# Patient Record
(demographics unavailable — no encounter records)

---

## 2025-04-02 NOTE — HISTORY OF PRESENT ILLNESS
[FreeTextEntry1] : Received live donor transplant from son on 11/20/17. He was hospitalized in June 2024 at [facility] for UTI received IV antibiotics. Was told to have aortic stenosis, no symptoms. Has seen cardiologist at [facility], now has been seeing Ct surgeon at [facility], planned for AVR in future. No symptoms yet. No chest pain/SOB/Dizziness or syncope. He had radiation in May 2024 for a lesion in the prostate (X 28 days). He is followed by urologist- Seth . He is also followed  at [place] [facility].  He had TAVR on Sept 11, 2024.  He had  first grandchild from daughter, now 2 months old. He has no acute symptoms today.   No activity restrictions. Son, donor is doing well. He is now retired, now works as  security at [employer].   Had eye surgery to fix strabismus on right eye in September 2023.  Had recent UTI treated.  Functional status 100%   He is taking Tacro 1.5 AM and 2 PM MMF 1000 BID Pred 5/d He is on Flomax 0.4/d ASA 81/d Atorvastatin 20/d Mounjaro 12.5 mg weekly Pantoprazole Repaglinide Metoprolol succ 50/d  Has had eye and Derm check  Has been going fishing

## 2025-04-02 NOTE — ASSESSMENT
[FreeTextEntry1] : Renal Transplant recipient: Noted allograft function, creatinine is stable/improved. No proteinuria. Tolerating medications. Lab data from last visit reviewed including allograft function, urinalysis, any viremia and trough level of medication. Immunosuppression: reviewed; Noted current regimen, maintenance regimen and reviewed target trough level. Hypertension: Home readings are controlled 130-135/70 ; Reviewed medications.  Reviewed target for blood pressure control. Recent UTI: Treated, resolved. Prostate Ca: Had radiation Hyperlipidemia: Reviewed medication regimen/lifestyle modification for maintaining target lipid levels. Cardiovascular risk reduction, primary/secondary prevention measures were discussed as appropriate.   Prophylaxis: Reviewed precautions to prevent infections. Aortic valve disease: Planned for procedure. He had second opinion. Discussed Renal Preservation strategies including achieving optimal weight through calory restriction and regular exercise, daily exercise, sleep hygiene, optimized control of glucose, blood pressure, lipids, avoidance of NSAIDs, Low Na and Low animal protein diet and medication adherence. Discussed ophthalmology and dermatology checks at least yearly and vaccinations. Flu vaccine yearly and Pneumonia vaccine every 5 years. Copy of office visit and lab reports are being made available to primary physician and referring nephrologist.

## 2025-04-02 NOTE — END OF VISIT
[>50% of Time Spent on Counseling for ____] : Greater than 50% of the encounter time was spent on counseling for [unfilled] [FreeTextEntry1] : JACKIE Ac [Time Spent: ___ minutes] : I have spent [unfilled] minutes of time on the encounter which excludes teaching and separately reported services.

## 2025-04-02 NOTE — PHYSICAL EXAM
[General Appearance - Alert] : alert [General Appearance - In No Acute Distress] : in no acute distress [Sclera] : the sclera and conjunctiva were normal [Outer Ear] : the ears and nose were normal in appearance [Jugular Venous Distention Increased] : there was no jugular-venous distention [Auscultation Breath Sounds / Voice Sounds] : lungs were clear to auscultation bilaterally [Apical Impulse] : the apical impulse was normal [Heart Sounds Gallop] : no gallops [Heart Sounds Pericardial Friction Rub] : no pericardial rub [Edema] : there was no peripheral edema [Abdomen Tenderness] : non-tender [Abdomen Mass (___ Cm)] : no abdominal mass palpated [Testes Swelling] : the testicles were not swollen [Cervical Lymph Nodes Enlarged Posterior Bilaterally] : posterior cervical [Cervical Lymph Nodes Enlarged Anterior Bilaterally] : anterior cervical [Supraclavicular Lymph Nodes Enlarged Bilaterally] : supraclavicular [Axillary Lymph Nodes Enlarged Bilaterally] : axillary [Inguinal Lymph Nodes Enlarged Bilaterally] : inguinal [Involuntary Movements] : no involuntary movements were seen [___ (cm) Fistula] : [unfilled] (cm) fistula [Bruit] : a bruit was present [Thrill] : a thrill was present [] : no rash [FreeTextEntry1] : lesion on left scapular area being treated by dermatologist [No Focal Deficits] : no focal deficits [Oriented To Time, Place, And Person] : oriented to person, place, and time [Impaired Insight] : insight and judgment were intact [Affect] : the affect was normal [Mood] : the mood was normal

## 2025-05-16 NOTE — REASON FOR VISIT
Patient is rescheduled for a Colonoscopy on 6/19/25 with Dr. JOSE RAUL Everett  Referral for procedure from outside referral     [Follow-Up] : a follow-up visit [FreeTextEntry1] : Post transplant follow up visit, no acute symptoms